# Patient Record
(demographics unavailable — no encounter records)

---

## 2024-10-07 NOTE — DISCUSSION/SUMMARY
[Medication Risks Reviewed] : Medication risks reviewed [de-identified] : General Dx Discussion The patient was advised of the diagnosis. The natural history of the pathology was explained in full to the patient in layman's terms. All questions were answered. The risks and benefits of surgical and non-surgical treatment alternatives were explained in full to the patient.  Case Discussed. Recommend Heel Lifts.  Mobic and Flexeril as needed. f/u 2 weeks. Anticipate starting PT at that time.   Patient was given a prescription for an anti-inflammatory medication.  They will take it for the next 5-7 days and then on an as needed basis, as long as there are no medical contra-indications.  Patient is counseled on possible GI and blood pressure side effects.  Entered by Laina SHRESTHA acting as a scribe. Instructions: Dr. Real- The documentation recorded by the scribe accurately reflects the service I personally performed and the decisions made by me.

## 2024-10-07 NOTE — HISTORY OF PRESENT ILLNESS
[8] : 8 [0] : 0 [Sharp] : sharp [Constant] : constant [Household chores] : household chores [Leisure] : leisure [Work] : work [Ice] : ice [Full time] : Work status: full time [de-identified] : Patient was doing knee raises today when she felt a pop in her right calf. Now having pain. No previous issues.  [] : no [FreeTextEntry1] : Right calf [FreeTextEntry9] : ADvil [de-identified] : twisting, pressure